# Patient Record
Sex: FEMALE | Race: WHITE | NOT HISPANIC OR LATINO | URBAN - METROPOLITAN AREA
[De-identification: names, ages, dates, MRNs, and addresses within clinical notes are randomized per-mention and may not be internally consistent; named-entity substitution may affect disease eponyms.]

---

## 2019-02-20 DIAGNOSIS — K80.10 CALCULUS OF GALLBLADDER WITH CHRONIC CHOLECYSTITIS WITHOUT OBSTRUCTION: Primary | ICD-10-CM

## 2019-02-21 ENCOUNTER — OUTSIDE PLACE OF SERVICE (OUTPATIENT)
Dept: SURGERY | Facility: CLINIC | Age: 54
End: 2019-02-21

## 2019-02-21 PROCEDURE — 47562 LAPAROSCOPIC CHOLECYSTECTOMY: CPT | Mod: ,,, | Performed by: SURGERY

## 2019-02-21 PROCEDURE — 47562 PR LAP,CHOLECYSTECTOMY: ICD-10-PCS | Mod: ,,, | Performed by: SURGERY

## 2019-03-12 ENCOUNTER — OFFICE VISIT (OUTPATIENT)
Dept: SURGERY | Facility: CLINIC | Age: 54
End: 2019-03-12

## 2019-03-12 VITALS
TEMPERATURE: 96 F | RESPIRATION RATE: 16 BRPM | WEIGHT: 154 LBS | SYSTOLIC BLOOD PRESSURE: 136 MMHG | BODY MASS INDEX: 25.66 KG/M2 | HEIGHT: 65 IN | DIASTOLIC BLOOD PRESSURE: 82 MMHG | OXYGEN SATURATION: 97 % | HEART RATE: 89 BPM

## 2019-03-12 DIAGNOSIS — Z87.898 NO POST-OP COMPLICATIONS: ICD-10-CM

## 2019-03-12 PROBLEM — K80.20 CHOLELITHIASIS WITHOUT OBSTRUCTION: Status: RESOLVED | Noted: 2019-03-12 | Resolved: 2019-03-12

## 2019-03-12 PROBLEM — K80.20 CHOLELITHIASIS WITHOUT OBSTRUCTION: Status: ACTIVE | Noted: 2019-03-12

## 2019-03-12 PROCEDURE — 99024 POSTOP FOLLOW-UP VISIT: CPT | Mod: S$GLB,,, | Performed by: SURGERY

## 2019-03-12 PROCEDURE — 99024 PR POST-OP FOLLOW-UP VISIT: ICD-10-PCS | Mod: S$GLB,,, | Performed by: SURGERY

## 2019-03-12 NOTE — PATIENT INSTRUCTIONS
Activity as tolerated with restrictions of no lifting over 35 lbs for an additional 4 wks.  If no complications arise then return on a PRN basis.

## 2019-03-12 NOTE — PROGRESS NOTES
"Elena Al is a 53 y.o. female patient.   No diagnosis found.  Past Medical History:   Diagnosis Date    Cholelithiasis without obstruction     Tobacco user      No past surgical history pertinent negatives on file.  Scheduled Meds:  Continuous Infusions:  PRN Meds:    Review of patient's allergies indicates:   Allergen Reactions    Codeine      There are no hospital problems to display for this patient.    Blood pressure 136/82, pulse 89, temperature 96.4 °F (35.8 °C), resp. rate 16, height 5' 5" (1.651 m), weight 69.9 kg (154 lb), SpO2 97 %.    Subjective:   Diet: Adequate intake.    Activity level: Returning to normal.    Pain control: Well controlled.    Wound: Subjective wound complaints: healed incisional sites.      Objective:  Vital signs (most recent): Blood pressure 136/82, pulse 89, temperature 96.4 °F (35.8 °C), resp. rate 16, height 5' 5" (1.651 m), weight 69.9 kg (154 lb), SpO2 97 %.  General appearance: Well-appearing, not in pain, comfortable and in no acute distress.    Lungs:  Normal effort.    Heart: Normal rate.    Abdomen: Abdomen is soft.    Bowel sounds:  Bowel sounds are normal.    Tenderness: There is no abdominal tenderness tenderness.    Wound:  Clean.  There is no drainage.    Extremities: There is normal range of motion.    Neurological: The patient is alert and oriented to person, place and time.       Assessment:   Post-op: 19 days.    Condition: In stable condition (Eating normal, having bowel movements without complications.  No complaints.).     Plan:  Activity Plan: No lifting anything heavier than 30 pounds.  Regular diet.  General Orders/Medications Plan: Follow up in my office on a prn basis.           Antonio Rogers NP  3/12/2019  "